# Patient Record
Sex: MALE | Race: WHITE | NOT HISPANIC OR LATINO | Employment: FULL TIME | URBAN - METROPOLITAN AREA
[De-identification: names, ages, dates, MRNs, and addresses within clinical notes are randomized per-mention and may not be internally consistent; named-entity substitution may affect disease eponyms.]

---

## 2021-12-16 ENCOUNTER — OFFICE VISIT (OUTPATIENT)
Dept: SURGERY | Facility: CLINIC | Age: 71
End: 2021-12-16

## 2021-12-16 ENCOUNTER — TRANSCRIBE ORDERS (OUTPATIENT)
Dept: SURGERY | Facility: CLINIC | Age: 71
End: 2021-12-16

## 2021-12-16 VITALS — BODY MASS INDEX: 39.14 KG/M2 | HEIGHT: 72 IN | WEIGHT: 289 LBS

## 2021-12-16 DIAGNOSIS — R19.5 POSITIVE COLORECTAL CANCER SCREENING USING COLOGUARD TEST: Primary | ICD-10-CM

## 2021-12-16 DIAGNOSIS — Z01.810 ENCOUNTER FOR PRE-OPERATIVE CARDIOVASCULAR CLEARANCE: Primary | ICD-10-CM

## 2021-12-16 DIAGNOSIS — I48.91 ATRIAL FIBRILLATION WITH RVR (HCC): ICD-10-CM

## 2021-12-16 PROCEDURE — 99203 OFFICE O/P NEW LOW 30 MIN: CPT | Performed by: PHYSICIAN ASSISTANT

## 2021-12-16 RX ORDER — SOTALOL HYDROCHLORIDE 80 MG/1
TABLET ORAL
COMMUNITY

## 2021-12-16 RX ORDER — ALLOPURINOL 300 MG/1
TABLET ORAL
COMMUNITY

## 2021-12-16 RX ORDER — APREMILAST 10-20-30MG
KIT ORAL
COMMUNITY

## 2021-12-16 RX ORDER — SIMVASTATIN 40 MG
TABLET ORAL
COMMUNITY

## 2021-12-16 RX ORDER — LISINOPRIL AND HYDROCHLOROTHIAZIDE 25; 20 MG/1; MG/1
TABLET ORAL
COMMUNITY

## 2021-12-16 NOTE — PROGRESS NOTES
"CC:    Positive Cologuard test    HPI:    This is a 71-year-old gentleman presenting to the office today at the request of Dr. Shin Carmona for consultation.  He underwent a Cologuard test in November 2020 which returned positive.  He stated that time that he was too busy and would schedule this on his own at a later date.  In follow-up Dr. Carmona noted that he had not done this as of yet and recommended he come to our office to discuss.  He denies having any symptoms associated with this to include no changes to his bowel habits, no dark tarry stools no pencil thin stools, no bright red blood with his bowel movements, no abdominal distention, no abdominal pain, no unexpected weight loss, increased fatigue or any other complaints.    He does have a history significant for atrial fibrillation, coronary artery disease and a history of an MI requiring a CABG in 2007.    PMH:    Reviewed and reconciled in epic; significant for A. fib, CAD, hypertension, MI    PSH:    Reviewed and reconciled in epic; significant for CABG 2007, cardiac catheterization, no previous colonoscopy    SH:  Does not smoke, does not consume alcohol    FMH:  No colorectal cancers family history    ALLERGIES:   Penicillin    MEDICATIONS:  Reviewed and reconciled in Epic.  Of note he is on Xarelto and an 81 mg aspirin    ROS:    All other systems reviewed and negative other than presenting complaints.    PE:  Vitals: Weight 289 height 72\" BMI 39.2 Discussed with patient increased perioperative risks associated with obesity including increased risks of DVT, infection, seromas, poor wound healing and hernias (with abdominal surgery).  Constitutional: Well-nourished, well-developed obese male in no acute distress  HEENT: Normocephalic and atraumatic, sclera anicteric, normal conjunctiva  Pulmonary: Clear auscultation bilaterally, normal respiratory effort, no wheezes, rales or rhonchi noted  Cardiac: Irregularly irregular heart rate, A. fib with RVR " pulse ranging  during our visit, no murmurs  Abdomen: Rotund, soft, nontender, nondistended    CLINICAL SUMMARY (A/P):    This is a 71-year-old gentleman presenting with a positive Cologuard that was from November 2020.  With this finding I am recommending proceeding with a colonoscopy for diagnostic purposes.  Risks and rationale were discussed with him with risk include but not limited to bleeding, infection, bowel perforation and the need for additional procedures.  Any additional follow-up we discussed with him once the results of been reviewed.  Prior to this we will need to obtain cardiac clearance and an appointment has been set up with Dr. Santacruz office for him to be able to come off of the Xarelto for 48 hours prior to the procedure and to assess his arrhythmia.  All questions were answered and he was willing to proceed with all recommendations.      Hadley Caldera PA-C

## 2021-12-29 ENCOUNTER — TELEPHONE (OUTPATIENT)
Dept: SURGERY | Facility: CLINIC | Age: 71
End: 2021-12-29

## 2021-12-29 NOTE — TELEPHONE ENCOUNTER
Pt saw Dr. Santacruz and they discussed a plan for how to get the patient cleared for the c-scope. It is more detailed in care everywhere the note on 12/29/21

## 2022-01-06 ENCOUNTER — PREP FOR SURGERY (OUTPATIENT)
Dept: OTHER | Facility: HOSPITAL | Age: 72
End: 2022-01-06

## 2022-01-06 DIAGNOSIS — R19.5 POSITIVE COLORECTAL CANCER SCREENING USING DNA-BASED STOOL TEST: Primary | ICD-10-CM

## 2022-02-02 ENCOUNTER — TELEPHONE (OUTPATIENT)
Dept: SURGERY | Facility: CLINIC | Age: 72
End: 2022-02-02

## 2022-02-02 NOTE — TELEPHONE ENCOUNTER
Left voicemail regarding new arrival time for colonoscopy on 2/8. Patient now needs to arrive at 830 am. I did ask patient to call our office back and confirm he received our message.

## 2022-02-08 ENCOUNTER — HOSPITAL ENCOUNTER (OUTPATIENT)
Facility: HOSPITAL | Age: 72
Setting detail: HOSPITAL OUTPATIENT SURGERY
Discharge: HOME | End: 2022-02-08
Attending: SURGERY | Admitting: SURGERY

## 2022-02-08 ENCOUNTER — ANESTHESIA (OUTPATIENT)
Dept: GASTROENTEROLOGY | Facility: HOSPITAL | Age: 72
End: 2022-02-08

## 2022-02-08 ENCOUNTER — ANESTHESIA EVENT (OUTPATIENT)
Dept: GASTROENTEROLOGY | Facility: HOSPITAL | Age: 72
End: 2022-02-08

## 2022-02-08 VITALS
SYSTOLIC BLOOD PRESSURE: 88 MMHG | OXYGEN SATURATION: 94 % | RESPIRATION RATE: 15 BRPM | BODY MASS INDEX: 38.96 KG/M2 | HEART RATE: 71 BPM | WEIGHT: 278.31 LBS | DIASTOLIC BLOOD PRESSURE: 66 MMHG | TEMPERATURE: 98.5 F | HEIGHT: 71 IN

## 2022-02-08 DIAGNOSIS — R19.5 POSITIVE COLORECTAL CANCER SCREENING USING DNA-BASED STOOL TEST: ICD-10-CM

## 2022-02-08 PROBLEM — K64.8 INTERNAL HEMORRHOIDS: Status: ACTIVE | Noted: 2022-02-08

## 2022-02-08 PROBLEM — K63.5 COLON POLYPS: Status: ACTIVE | Noted: 2022-02-08

## 2022-02-08 PROCEDURE — 88313 SPECIAL STAINS GROUP 2: CPT | Performed by: SURGERY

## 2022-02-08 PROCEDURE — 25010000002 PHENYLEPHRINE 10 MG/ML SOLUTION: Performed by: ANESTHESIOLOGY

## 2022-02-08 PROCEDURE — 45380 COLONOSCOPY AND BIOPSY: CPT | Performed by: SURGERY

## 2022-02-08 PROCEDURE — 45385 COLONOSCOPY W/LESION REMOVAL: CPT | Performed by: SURGERY

## 2022-02-08 PROCEDURE — 25010000002 PROPOFOL 10 MG/ML EMULSION: Performed by: ANESTHESIOLOGY

## 2022-02-08 PROCEDURE — S0260 H&P FOR SURGERY: HCPCS | Performed by: SURGERY

## 2022-02-08 PROCEDURE — 45381 COLONOSCOPY SUBMUCOUS NJX: CPT | Performed by: SURGERY

## 2022-02-08 PROCEDURE — 88305 TISSUE EXAM BY PATHOLOGIST: CPT | Performed by: SURGERY

## 2022-02-08 DEVICE — CLIPPING DEVICE
Type: IMPLANTABLE DEVICE | Site: DESCENDING COLON | Status: FUNCTIONAL
Brand: RESOLUTION CLIP

## 2022-02-08 RX ORDER — SODIUM CHLORIDE, SODIUM LACTATE, POTASSIUM CHLORIDE, CALCIUM CHLORIDE 600; 310; 30; 20 MG/100ML; MG/100ML; MG/100ML; MG/100ML
30 INJECTION, SOLUTION INTRAVENOUS CONTINUOUS PRN
Status: DISCONTINUED | OUTPATIENT
Start: 2022-02-08 | End: 2022-02-08 | Stop reason: HOSPADM

## 2022-02-08 RX ORDER — PROPOFOL 10 MG/ML
VIAL (ML) INTRAVENOUS AS NEEDED
Status: DISCONTINUED | OUTPATIENT
Start: 2022-02-08 | End: 2022-02-08 | Stop reason: SURG

## 2022-02-08 RX ORDER — LIDOCAINE HYDROCHLORIDE 20 MG/ML
INJECTION, SOLUTION INFILTRATION; PERINEURAL AS NEEDED
Status: DISCONTINUED | OUTPATIENT
Start: 2022-02-08 | End: 2022-02-08 | Stop reason: SURG

## 2022-02-08 RX ORDER — PHENYLEPHRINE HYDROCHLORIDE 10 MG/ML
INJECTION INTRAVENOUS AS NEEDED
Status: DISCONTINUED | OUTPATIENT
Start: 2022-02-08 | End: 2022-02-08 | Stop reason: SURG

## 2022-02-08 RX ADMIN — PHENYLEPHRINE HYDROCHLORIDE 100 MCG: 10 INJECTION, SOLUTION INTRAVENOUS at 10:19

## 2022-02-08 RX ADMIN — LIDOCAINE HYDROCHLORIDE 60 MG: 20 INJECTION, SOLUTION INFILTRATION; PERINEURAL at 09:50

## 2022-02-08 RX ADMIN — PROPOFOL 100 MG: 10 INJECTION, EMULSION INTRAVENOUS at 09:50

## 2022-02-08 RX ADMIN — SODIUM CHLORIDE, POTASSIUM CHLORIDE, SODIUM LACTATE AND CALCIUM CHLORIDE 30 ML/HR: 600; 310; 30; 20 INJECTION, SOLUTION INTRAVENOUS at 09:29

## 2022-02-08 NOTE — NURSING NOTE
During the patient's recovery stay, his BP was continuously low. A full Liter of IV fluids were infused. PO water given. Anesthesia was aware and treated BP. The patient never stated that he felt his BP was low, completely asymptomatic. Patient said he took his BP med Lisinopril last night as well as this morning, which he states he typically does not do.  I instructed the patient and his wife to be mindful of any dizziness and to recheck his BP once arriving home, after eating and resting. Also to please inform his primary MD if his BP remains low or if he becomes symptomatic. I spoke with Dr. Wright again before sending the patient home and she agreed that he could be D/C'd home. Patient was ready to go home, and was happy to leave, no complaints noted, no acute distress noted, some abd bloating noted, encouraged to pass gas.

## 2022-02-08 NOTE — ANESTHESIA POSTPROCEDURE EVALUATION
Patient: Segundo Cantor    Procedure Summary     Date: 02/08/22 Room / Location:  [facility]    Anesthesia Start: 0949 Anesthesia Stop: 1043    Procedure: COLONOSCOPY to cecum  into TI with hot snare/cold bx polypectomies with clip placement x2, saline lift injection, and needle tattoo injection (N/A ) Diagnosis:       Positive colorectal cancer screening using DNA-based stool test      (Positive colorectal cancer screening using DNA-based stool test [R19.5])    Surgeons: Parker Morales MD Provider: Chikis Wright MD    Anesthesia Type: MAC ASA Status: 3          Anesthesia Type: MAC    Vitals  Vitals Value Taken Time   BP 74/53 02/08/22 1036   Temp     Pulse 82 02/08/22 1036   Resp 16 02/08/22 1036   SpO2 93 % 02/08/22 1036           Post Anesthesia Care and Evaluation    Patient location during evaluation: bedside  Patient participation: complete - patient participated  Level of consciousness: awake  Pain management: adequate  Airway patency: patent  Anesthetic complications: No anesthetic complications    Cardiovascular status: acceptable  Respiratory status: acceptable  Hydration status: acceptable

## 2022-02-08 NOTE — H&P
"CC:    Positive Cologuard test     HPI:    This is a 71-year-old gentleman presenting to the office today at the request of Dr. Shin Carmona for consultation.  He underwent a Cologuard test in November 2020 which returned positive.  He stated that time that he was too busy and would schedule this on his own at a later date.  In follow-up Dr. Carmona noted that he had not done this as of yet and recommended he come to our office to discuss.  He denies having any symptoms associated with this to include no changes to his bowel habits, no dark tarry stools no pencil thin stools, no bright red blood with his bowel movements, no abdominal distention, no abdominal pain, no unexpected weight loss, increased fatigue or any other complaints.     He does have a history significant for atrial fibrillation, coronary artery disease and a history of an MI requiring a CABG in 2007.     PMH:    Reviewed and reconciled in epic; significant for A. fib, CAD, hypertension, MI     PSH:    Reviewed and reconciled in epic; significant for CABG 2007, cardiac catheterization, no previous colonoscopy     SH:  Does not smoke, does not consume alcohol     FMH:  No colorectal cancers family history     ALLERGIES:   Penicillin     MEDICATIONS:  Reviewed and reconciled in Epic.  Of note he is on Xarelto and an 81 mg aspirin     ROS:    All other systems reviewed and negative other than presenting complaints.     PE:  Vitals: Weight 289 height 72\" BMI 39.2 Discussed with patient increased perioperative risks associated with obesity including increased risks of DVT, infection, seromas, poor wound healing and hernias (with abdominal surgery).  Constitutional: Well-nourished, well-developed obese male in no acute distress  HEENT: Normocephalic and atraumatic, sclera anicteric, normal conjunctiva  Pulmonary: Clear auscultation bilaterally, normal respiratory effort, no wheezes, rales or rhonchi noted  Cardiac: Irregularly irregular heart rate, A. fib " with RVR pulse ranging  during our visit, no murmurs  Abdomen: Rotund, soft, nontender, nondistended     CLINICAL SUMMARY (A/P):    This is a 71-year-old gentleman presenting with a positive Cologuard that was from November 2020.  With this finding I am recommending proceeding with a colonoscopy for diagnostic purposes.  Risks and rationale were discussed with him with risk include but not limited to bleeding, infection, bowel perforation and the need for additional procedures.  Any additional follow-up we discussed with him once the results of been reviewed. All questions were answered and he was willing to proceed with all recommendations.

## 2022-02-10 LAB
LAB AP CASE REPORT: NORMAL
PATH REPORT.FINAL DX SPEC: NORMAL
PATH REPORT.GROSS SPEC: NORMAL

## 2022-02-14 ENCOUNTER — TELEPHONE (OUTPATIENT)
Dept: SURGERY | Facility: CLINIC | Age: 72
End: 2022-02-14

## 2022-02-14 NOTE — TELEPHONE ENCOUNTER
----- Message from Parker Morales MD sent at 2/11/2022  3:32 PM EST -----  Please call the patient with c-scope results. PIC c-scope in 6 months

## 2023-08-08 ENCOUNTER — INPATIENT HOSPITAL (OUTPATIENT)
Dept: URBAN - METROPOLITAN AREA HOSPITAL 107 | Facility: HOSPITAL | Age: 73
End: 2023-08-08
Payer: MEDICARE

## 2023-08-08 DIAGNOSIS — K92.2 GASTROINTESTINAL HEMORRHAGE, UNSPECIFIED: ICD-10-CM

## 2023-08-08 DIAGNOSIS — D64.9 ANEMIA, UNSPECIFIED: ICD-10-CM

## 2023-08-08 PROCEDURE — 99222 1ST HOSP IP/OBS MODERATE 55: CPT | Performed by: INTERNAL MEDICINE

## 2023-08-09 ENCOUNTER — INPATIENT HOSPITAL (OUTPATIENT)
Dept: URBAN - METROPOLITAN AREA HOSPITAL 107 | Facility: HOSPITAL | Age: 73
End: 2023-08-09
Payer: MEDICARE

## 2023-08-09 DIAGNOSIS — D62 ACUTE POSTHEMORRHAGIC ANEMIA: ICD-10-CM

## 2023-08-09 DIAGNOSIS — K29.70 GASTRITIS, UNSPECIFIED, WITHOUT BLEEDING: ICD-10-CM

## 2023-08-09 DIAGNOSIS — K55.20 ANGIODYSPLASIA OF COLON WITHOUT HEMORRHAGE: ICD-10-CM

## 2023-08-09 PROCEDURE — 45388 COLONOSCOPY W/ABLATION: CPT | Performed by: INTERNAL MEDICINE

## 2023-08-09 PROCEDURE — 43239 EGD BIOPSY SINGLE/MULTIPLE: CPT | Performed by: INTERNAL MEDICINE

## 2025-07-25 ENCOUNTER — ON CAMPUS - OUTPATIENT (OUTPATIENT)
Dept: URBAN - METROPOLITAN AREA HOSPITAL 108 | Facility: HOSPITAL | Age: 75
End: 2025-07-25
Payer: MEDICARE

## 2025-07-25 DIAGNOSIS — D50.9 IRON DEFICIENCY ANEMIA, UNSPECIFIED: ICD-10-CM

## 2025-07-25 DIAGNOSIS — Z79.01 LONG TERM (CURRENT) USE OF ANTICOAGULANTS: ICD-10-CM

## 2025-07-25 DIAGNOSIS — K63.9 DISEASE OF INTESTINE, UNSPECIFIED: ICD-10-CM

## 2025-07-25 DIAGNOSIS — R93.3 ABNORMAL FINDINGS ON DIAGNOSTIC IMAGING OF OTHER PARTS OF DI: ICD-10-CM

## 2025-07-25 PROCEDURE — 99222 1ST HOSP IP/OBS MODERATE 55: CPT | Performed by: INTERNAL MEDICINE

## 2025-07-26 PROCEDURE — 99232 SBSQ HOSP IP/OBS MODERATE 35: CPT | Performed by: INTERNAL MEDICINE

## 2025-07-28 ENCOUNTER — INPATIENT HOSPITAL (OUTPATIENT)
Dept: URBAN - METROPOLITAN AREA HOSPITAL 107 | Facility: HOSPITAL | Age: 75
End: 2025-07-28
Payer: MEDICARE

## 2025-07-28 DIAGNOSIS — D50.9 IRON DEFICIENCY ANEMIA, UNSPECIFIED: ICD-10-CM

## 2025-07-28 DIAGNOSIS — D12.2 BENIGN NEOPLASM OF ASCENDING COLON: ICD-10-CM

## 2025-07-28 PROCEDURE — 45385 COLONOSCOPY W/LESION REMOVAL: CPT | Performed by: INTERNAL MEDICINE

## 2025-07-28 PROCEDURE — 43235 EGD DIAGNOSTIC BRUSH WASH: CPT | Performed by: INTERNAL MEDICINE

## (undated) DEVICE — SINGLE-USE BIOPSY FORCEPS: Brand: RADIAL JAW 4

## (undated) DEVICE — SNAR POLYP SENSATION STDOVL 27 240 BX40

## (undated) DEVICE — LN SMPL CO2 SHTRM SD STREAM W/M LUER

## (undated) DEVICE — CANN O2 ETCO2 FITS ALL CONN CO2 SMPL A/ 7IN DISP LF

## (undated) DEVICE — ADAPT CLN BIOGUARD AIR/H2O DISP

## (undated) DEVICE — KT ORCA ORCAPOD DISP STRL

## (undated) DEVICE — SENSR O2 OXIMAX FNGR A/ 18IN NONSTR

## (undated) DEVICE — THE SINGLE USE ETRAP – POLYP TRAP IS USED FOR SUCTION RETRIEVAL OF ENDOSCOPICALLY REMOVED POLYPS.: Brand: ETRAP

## (undated) DEVICE — THE CARR-LOCKE INJECTION NEEDLE IS A SINGLE USE, DISPOSABLE, FLEXIBLE SHEATH INJECTION NEEDLE USED FOR THE INJECTION OF VARIOUS TYPES OF MEDIA THROUGH FLEXIBLE ENDOSCOPES.

## (undated) DEVICE — TUBING, SUCTION, 1/4" X 10', STRAIGHT: Brand: MEDLINE